# Patient Record
Sex: FEMALE | URBAN - METROPOLITAN AREA
[De-identification: names, ages, dates, MRNs, and addresses within clinical notes are randomized per-mention and may not be internally consistent; named-entity substitution may affect disease eponyms.]

---

## 2021-07-25 ENCOUNTER — OUT OF OFFICE VISIT (OUTPATIENT)
Dept: URBAN - METROPOLITAN AREA MEDICAL CENTER 25 | Facility: MEDICAL CENTER | Age: 66
End: 2021-07-25
Payer: MEDICARE

## 2021-07-25 DIAGNOSIS — D72.829 ELEVATED WBC COUNT: ICD-10-CM

## 2021-07-25 DIAGNOSIS — R19.7 ACUTE DIARRHEA: ICD-10-CM

## 2021-07-25 DIAGNOSIS — K62.5 ANAL BLEEDING: ICD-10-CM

## 2021-07-25 PROCEDURE — G8427 DOCREV CUR MEDS BY ELIG CLIN: HCPCS | Performed by: INTERNAL MEDICINE

## 2021-07-25 PROCEDURE — 99222 1ST HOSP IP/OBS MODERATE 55: CPT | Performed by: INTERNAL MEDICINE

## 2021-07-26 ENCOUNTER — OUT OF OFFICE VISIT (OUTPATIENT)
Dept: URBAN - METROPOLITAN AREA MEDICAL CENTER 25 | Facility: MEDICAL CENTER | Age: 66
End: 2021-07-26
Payer: MEDICARE

## 2021-07-26 DIAGNOSIS — R11.2 ACUTE NAUSEA WITH NONBILIOUS VOMITING: ICD-10-CM

## 2021-07-26 DIAGNOSIS — K92.1 BLACK STOOL: ICD-10-CM

## 2021-07-26 DIAGNOSIS — A04.72 C. DIFFICILE COLITIS: ICD-10-CM

## 2021-07-26 DIAGNOSIS — D50.9 ANEMIA, IRON DEFICIENCY: ICD-10-CM

## 2021-07-26 PROCEDURE — 99233 SBSQ HOSP IP/OBS HIGH 50: CPT | Performed by: INTERNAL MEDICINE

## 2021-07-26 PROCEDURE — 99232 SBSQ HOSP IP/OBS MODERATE 35: CPT | Performed by: INTERNAL MEDICINE

## 2021-09-12 ENCOUNTER — OUT OF OFFICE VISIT (OUTPATIENT)
Dept: URBAN - METROPOLITAN AREA MEDICAL CENTER 28 | Facility: MEDICAL CENTER | Age: 66
End: 2021-09-12
Payer: MEDICARE

## 2021-09-12 DIAGNOSIS — D62 ACUTE BLOOD LOSS ANEMIA: ICD-10-CM

## 2021-09-12 DIAGNOSIS — K29.60 ADENOPAPILLOMATOSIS GASTRICA: ICD-10-CM

## 2021-09-12 DIAGNOSIS — K92.1 ACUTE MELENA: ICD-10-CM

## 2021-09-12 PROCEDURE — 43235 EGD DIAGNOSTIC BRUSH WASH: CPT | Performed by: INTERNAL MEDICINE

## 2021-10-05 PROBLEM — 105502003: Status: ACTIVE | Noted: 2021-10-05

## 2021-10-05 PROBLEM — 87522002 IRON DEFICIENCY ANEMIA: Status: ACTIVE | Noted: 2021-10-05

## 2021-10-05 PROBLEM — 236435004: Status: ACTIVE | Noted: 2021-10-05

## 2021-10-06 ENCOUNTER — OFFICE VISIT (OUTPATIENT)
Dept: URBAN - METROPOLITAN AREA CLINIC 12 | Facility: CLINIC | Age: 66
End: 2021-10-06

## 2021-10-06 NOTE — HPI-TODAY'S VISIT:
The patient is a 65-year-old female with a history of hypertension diabetes and end-stage renal disease on peritoneal dialysis. She had been seen in the urgency room and hospitalized [facility]. She was seen by Dr. Wilkes bedtime. This was in July 2021 . She had been having ongoing diarrhea at that time. CT scan at that time was negative. She had been having hematochezia and a drop in her hemoglobin as well to 7.3. She was admitted to the hospital and found to have positive C. diff and Shiga toxin in her stool studies. Therefore she was started on vancomycin and recommended to have an outpatient GI follow up for colonoscopy. To work up her anemia.

## 2021-10-11 ENCOUNTER — OUT OF OFFICE VISIT (OUTPATIENT)
Dept: URBAN - METROPOLITAN AREA MEDICAL CENTER 28 | Facility: MEDICAL CENTER | Age: 66
End: 2021-10-11
Payer: MEDICARE

## 2021-10-11 DIAGNOSIS — D64.89 ANEMIA DUE TO OTHER CAUSE: ICD-10-CM

## 2021-10-11 DIAGNOSIS — A04.72 C. DIFFICILE: ICD-10-CM

## 2021-10-11 PROCEDURE — G8427 DOCREV CUR MEDS BY ELIG CLIN: HCPCS | Performed by: INTERNAL MEDICINE

## 2021-10-11 PROCEDURE — 99222 1ST HOSP IP/OBS MODERATE 55: CPT | Performed by: INTERNAL MEDICINE

## 2021-10-13 ENCOUNTER — OUT OF OFFICE VISIT (OUTPATIENT)
Dept: URBAN - METROPOLITAN AREA MEDICAL CENTER 28 | Facility: MEDICAL CENTER | Age: 66
End: 2021-10-13
Payer: MEDICARE

## 2021-10-13 DIAGNOSIS — D64.89 ANEMIA DUE TO OTHER CAUSE: ICD-10-CM

## 2021-10-13 DIAGNOSIS — A04.72 C. DIFFICILE: ICD-10-CM

## 2021-10-13 PROCEDURE — 99232 SBSQ HOSP IP/OBS MODERATE 35: CPT | Performed by: PHYSICIAN ASSISTANT

## 2021-11-24 ENCOUNTER — OFFICE VISIT (OUTPATIENT)
Dept: URBAN - METROPOLITAN AREA CLINIC 12 | Facility: CLINIC | Age: 66
End: 2021-11-24
Payer: MEDICARE

## 2021-11-24 DIAGNOSIS — R11.2 NAUSEA & VOMITING: ICD-10-CM

## 2021-11-24 DIAGNOSIS — Z12.11 SCREEN FOR COLON CANCER: ICD-10-CM

## 2021-11-24 DIAGNOSIS — A04.72 C. DIFFICILE COLITIS: ICD-10-CM

## 2021-11-24 PROCEDURE — 99204 OFFICE O/P NEW MOD 45 MIN: CPT | Performed by: INTERNAL MEDICINE

## 2021-11-24 PROCEDURE — 99255 IP/OBS CONSLTJ NEW/EST HI 80: CPT | Performed by: INTERNAL MEDICINE

## 2021-11-24 RX ORDER — PROMETHAZINE HYDROCHLORIDE 12.5 MG/1
1 TABLET AS NEEDED TABLET ORAL EVERY 8 HOURS
Qty: 90 | Refills: 0 | OUTPATIENT

## 2021-11-24 NOTE — HPI-TODAY'S VISIT:
64 yo female presenting for evaluation for diarrhea, nausea reffered by Dr. Sal Al . A copy of this note will be sent to the referring physician The patient was seen that [facility] in July 2021 at that time for GI bleeding and diarrhea and there was a plan to do a colonoscopy however her stool came back with shigalike toxin positive C. diff positive and her colonoscopy was canceled and she was treated with 2 weeks of vancomycin.  Her records and states she redeveloped diarrhea in September and completed another round of vancomycin  On October 9 her family brought her to the emergency room with dark green/black loose stool and was tested positive for C. diff she was seen by ID and placed on Dificid initially. She underwent an upper endoscopy with Dr. Reeves October 12 which showed mild gastritis but otherwise no abnormality  - ========================================================================= -she is also having trouble with vomiting and eating at this point -her daughter gives the history- she states that she is not able to eat food regularly at this point -she was referred as well for a colonoscopy - she is being evaluated for kidney transplant and will need this eventually -patient denies any abdominal pain, no blood in the stool or black stool -states that the peritoneal dialysis site is uncomfortable  -the bowel movements at this point are a little more formed at this time -she is doing difficid bid - she is apprently taking one month of this - she has two more weeks of the vancomycin at this point -she has no trouble keeping liquids -she can't tolerate her nepro shake -per her daughter she got phenergan in the hosptial and tolerated this -she is eating a lot of chicken, she doesn't -she had labwork with her kidney specialist - Dr. Sla Al- she is on potassium supplements, her kidney function is worse and dialysis is being adjusted for

## 2022-01-19 ENCOUNTER — OFFICE VISIT (OUTPATIENT)
Dept: URBAN - METROPOLITAN AREA CLINIC 12 | Facility: CLINIC | Age: 67
End: 2022-01-19

## 2022-03-09 ENCOUNTER — DASHBOARD ENCOUNTERS (OUTPATIENT)
Age: 67
End: 2022-03-09

## 2022-03-09 ENCOUNTER — OFFICE VISIT (OUTPATIENT)
Dept: URBAN - METROPOLITAN AREA CLINIC 12 | Facility: CLINIC | Age: 67
End: 2022-03-09
Payer: MEDICARE

## 2022-03-09 ENCOUNTER — TELEPHONE ENCOUNTER (OUTPATIENT)
Dept: URBAN - METROPOLITAN AREA CLINIC 92 | Facility: CLINIC | Age: 67
End: 2022-03-09

## 2022-03-09 DIAGNOSIS — R11.2 NAUSEA & VOMITING: ICD-10-CM

## 2022-03-09 DIAGNOSIS — Z12.11 SCREEN FOR COLON CANCER: ICD-10-CM

## 2022-03-09 DIAGNOSIS — A04.72 C. DIFFICILE COLITIS: ICD-10-CM

## 2022-03-09 PROCEDURE — G9622 NO UNHEAL ETOH USER: HCPCS | Performed by: INTERNAL MEDICINE

## 2022-03-09 PROCEDURE — G8420 CALC BMI NORM PARAMETERS: HCPCS | Performed by: INTERNAL MEDICINE

## 2022-03-09 PROCEDURE — 3017F COLORECTAL CA SCREEN DOC REV: CPT | Performed by: INTERNAL MEDICINE

## 2022-03-09 PROCEDURE — 1036F TOBACCO NON-USER: CPT | Performed by: INTERNAL MEDICINE

## 2022-03-09 PROCEDURE — 99214 OFFICE O/P EST MOD 30 MIN: CPT | Performed by: INTERNAL MEDICINE

## 2022-03-09 PROCEDURE — G8427 DOCREV CUR MEDS BY ELIG CLIN: HCPCS | Performed by: INTERNAL MEDICINE

## 2022-03-09 PROCEDURE — G9903 PT SCRN TBCO ID AS NON USER: HCPCS | Performed by: INTERNAL MEDICINE

## 2022-03-09 RX ORDER — POLYETHYLENE GLYCOL 3350, SODIUM SULFATE ANHYDROUS, SODIUM BICARBONATE, SODIUM CHLORIDE, POTASSIUM CHLORIDE 236; 22.74; 6.74; 5.86; 2.97 G/4L; G/4L; G/4L; G/4L; G/4L
AS DIRECTED POWDER, FOR SOLUTION ORAL
Qty: 1 | Refills: 0 | OUTPATIENT

## 2022-03-09 RX ORDER — PROMETHAZINE HYDROCHLORIDE 12.5 MG/1
1 TABLET AS NEEDED TABLET ORAL EVERY 8 HOURS
Qty: 90 | Refills: 0 | Status: ACTIVE | COMMUNITY

## 2022-03-09 RX ORDER — CLOPIDOGREL BISULFATE 75 MG/1
1 TABLET TABLET ORAL ONCE A DAY
Status: ACTIVE | COMMUNITY

## 2022-03-09 NOTE — HPI-TODAY'S VISIT:
66 yo female presenting for evaluation for diarrhea, nausea reffered by Dr. Sal Al . A copy of this note will be sent to the referring physician The patient was seen that [facility] in July 2021 at that time for GI bleeding and diarrhea and there was a plan to do a colonoscopy however her stool came back with shigalike toxin positive C. diff positive and her colonoscopy was canceled and she was treated with 2 weeks of vancomycin.  Her records and states she redeveloped diarrhea in September and completed another round of vancomycin  On October 9 her family brought her to the emergency room with dark green/black loose stool and was tested positive for C. diff she was seen by ID and placed on Dificid initially. She underwent an upper endoscopy with Dr. Reeves October 12 which showed mild gastritis but otherwise no abnormality  - ========================================================================= -she is also having trouble with vomiting and eating at this point -her daughter gives the history- she states that she is not able to eat food regularly at this point -she was referred as well for a colonoscopy - she is being evaluated for kidney transplant and will need this eventually -patient denies any abdominal pain, no blood in the stool or black stool -states that the peritoneal dialysis site is uncomfortable  -the bowel movements at this point are a little more formed at this time -she is doing difficid bid - she is apprently taking one month of this - she has two more weeks of the vancomycin at this point -she has no trouble keeping liquids -she can't tolerate her nepro shake -per her daughter she got phenergan in the hosptial and tolerated this -she is eating a lot of chicken, she doesn't -she had labwork with her kidney specialist - Dr. Sal Al- she is on potassium supplements, her kidney function is worse and dialysis is being adjusted for ======================================================================= 3/9/2022 she states that the eating has been much better  states that there are no vomiting episodes no blood in the stool or black stool no abdominal pain she states that her bowel movements are doing well -no diarrhea at this point no antibiotics at this time per list went to er in 3/7/ for sob- because she had missed HD -was hosptialized in February for edema in NS -she is on clopidogrel 75 mg daily -she is getting evaluated at [place] for kidney transplant- states that she was seen yesterday

## 2022-03-09 NOTE — PREVIOUS WORKUP REVIEWED
.  ENDOSCOPIES Upper endoscopy-October 2021-mild gastritis otherwise normal.   LABS   IMAGES CT abdomen pelvis-October 2021-moderate ascites with peritoneal dialysis catheter. Mild ascending wall thickening understanding versus colitis, otherwise unremarkable from [facility]

## 2022-03-30 ENCOUNTER — TELEPHONE ENCOUNTER (OUTPATIENT)
Dept: URBAN - METROPOLITAN AREA CLINIC 92 | Facility: CLINIC | Age: 67
End: 2022-03-30

## 2022-03-30 PROBLEM — 240357000 CLOSTRIDIAL ENTERIC DISEASE: Status: ACTIVE | Noted: 2021-10-05

## 2022-04-28 PROBLEM — 305058001: Status: ACTIVE | Noted: 2021-11-24

## 2022-05-12 ENCOUNTER — OFFICE VISIT (OUTPATIENT)
Dept: URBAN - METROPOLITAN AREA MEDICAL CENTER 27 | Facility: MEDICAL CENTER | Age: 67
End: 2022-05-12